# Patient Record
Sex: FEMALE | URBAN - METROPOLITAN AREA
[De-identification: names, ages, dates, MRNs, and addresses within clinical notes are randomized per-mention and may not be internally consistent; named-entity substitution may affect disease eponyms.]

---

## 2017-06-14 ENCOUNTER — IMPORTED ENCOUNTER (OUTPATIENT)
Dept: URBAN - METROPOLITAN AREA CLINIC 43 | Facility: CLINIC | Age: 82
End: 2017-06-14

## 2017-06-14 PROBLEM — H35.363: Noted: 2017-06-14

## 2017-06-14 PROBLEM — H43.812: Noted: 2017-06-14

## 2017-06-14 PROBLEM — H40.1131: Noted: 2017-06-14

## 2017-07-13 ENCOUNTER — APPOINTMENT (RX ONLY)
Dept: URBAN - METROPOLITAN AREA CLINIC 124 | Facility: CLINIC | Age: 82
Setting detail: DERMATOLOGY
End: 2017-07-13

## 2017-07-13 DIAGNOSIS — Z71.89 OTHER SPECIFIED COUNSELING: ICD-10-CM

## 2017-07-13 DIAGNOSIS — L57.8 OTHER SKIN CHANGES DUE TO CHRONIC EXPOSURE TO NONIONIZING RADIATION: ICD-10-CM

## 2017-07-13 DIAGNOSIS — L82.1 OTHER SEBORRHEIC KERATOSIS: ICD-10-CM

## 2017-07-13 DIAGNOSIS — L81.4 OTHER MELANIN HYPERPIGMENTATION: ICD-10-CM

## 2017-07-13 PROBLEM — I10 ESSENTIAL (PRIMARY) HYPERTENSION: Status: ACTIVE | Noted: 2017-07-13

## 2017-07-13 PROBLEM — E78.5 HYPERLIPIDEMIA, UNSPECIFIED: Status: ACTIVE | Noted: 2017-07-13

## 2017-07-13 PROCEDURE — 99203 OFFICE O/P NEW LOW 30 MIN: CPT

## 2017-07-13 PROCEDURE — ? COUNSELING

## 2017-07-13 ASSESSMENT — LOCATION ZONE DERM
LOCATION ZONE: FACE
LOCATION ZONE: TRUNK

## 2017-07-13 ASSESSMENT — LOCATION SIMPLE DESCRIPTION DERM
LOCATION SIMPLE: LEFT CHEEK
LOCATION SIMPLE: CHEST
LOCATION SIMPLE: RIGHT UPPER BACK

## 2017-07-13 ASSESSMENT — LOCATION DETAILED DESCRIPTION DERM
LOCATION DETAILED: LEFT LATERAL SUPERIOR CHEST
LOCATION DETAILED: RIGHT INFERIOR MEDIAL UPPER BACK
LOCATION DETAILED: LEFT INFERIOR MEDIAL MALAR CHEEK

## 2017-07-13 NOTE — PROCEDURE: MIPS QUALITY
Quality 226: Preventive Care And Screening: Tobacco Use: Screening And Cessation Intervention: Patient screened for tobacco and never smoked
Quality 47: Advance Care Plan: Advance Care Planning discussed and documented in the medical record; patient did not wish or was not able to name a surrogate decision maker or provide an advance care plan.
Quality 110: Preventive Care And Screening: Influenza Immunization: Influenza Immunization previously received during influenza season
Detail Level: Detailed
Quality 111:Pneumonia Vaccination Status For Older Adults: Pneumococcal Vaccination Previously Received
Quality 130: Documentation Of Current Medications In The Medical Record: Current Medications Documented
Quality 131: Pain Assessment And Follow-Up: Pain assessment using a standardized tool is documented as negative, no follow-up plan required

## 2017-10-06 NOTE — PATIENT DISCUSSION
Discussed the risks/benefits of laser capsulotomy. Laser recommended. Patient elects to proceed with OD only at this time.

## 2017-10-25 NOTE — PROCEDURE NOTE: CLINICAL
PROCEDURE NOTE: YAG Capsulotomy OD. Diagnosis: Visually Significant PCO. Anesthesia: Topical. Prior to treatment, the risks/benefits/alternatives were discussed. The patient wished to proceed with procedure. Power = 2.8 mJ. Number of pulses = 38. Patient tolerated procedure well. There were no complications. Post-procedure instructions given. Lynne Wade

## 2018-01-22 ENCOUNTER — IMPORTED ENCOUNTER (OUTPATIENT)
Dept: URBAN - METROPOLITAN AREA CLINIC 43 | Facility: CLINIC | Age: 83
End: 2018-01-22

## 2018-01-22 PROBLEM — H40.1131: Noted: 2018-01-22

## 2018-07-18 ENCOUNTER — IMPORTED ENCOUNTER (OUTPATIENT)
Dept: URBAN - METROPOLITAN AREA CLINIC 43 | Facility: CLINIC | Age: 83
End: 2018-07-18

## 2018-07-18 PROBLEM — H35.363: Noted: 2018-07-18

## 2018-07-18 PROBLEM — H40.1131: Noted: 2018-07-18

## 2019-01-25 ENCOUNTER — IMPORTED ENCOUNTER (OUTPATIENT)
Dept: URBAN - METROPOLITAN AREA CLINIC 43 | Facility: CLINIC | Age: 84
End: 2019-01-25

## 2019-01-25 PROBLEM — H40.1131: Noted: 2019-01-25

## 2019-01-30 ENCOUNTER — IMPORTED ENCOUNTER (OUTPATIENT)
Dept: URBAN - METROPOLITAN AREA CLINIC 43 | Facility: CLINIC | Age: 84
End: 2019-01-30

## 2019-07-26 ENCOUNTER — PREPPED CHART (OUTPATIENT)
Dept: URBAN - METROPOLITAN AREA CLINIC 32 | Facility: CLINIC | Age: 84
End: 2019-07-26

## 2019-07-26 ENCOUNTER — IMPORTED ENCOUNTER (OUTPATIENT)
Dept: URBAN - METROPOLITAN AREA CLINIC 43 | Facility: CLINIC | Age: 84
End: 2019-07-26

## 2019-07-26 PROBLEM — H27.111: Noted: 2019-07-26

## 2019-07-26 PROBLEM — H40.1131: Noted: 2019-07-26

## 2020-02-04 ASSESSMENT — TONOMETRY
OS_IOP_MMHG: 14
OD_IOP_MMHG: 16

## 2020-02-04 ASSESSMENT — VISUAL ACUITY
OD_SC: J1+-1
OD_SC: 20/25
OS_SC: J1+
OS_SC: 20/25-3

## 2020-02-05 ENCOUNTER — FOLLOW UP (OUTPATIENT)
Dept: URBAN - METROPOLITAN AREA CLINIC 32 | Facility: CLINIC | Age: 85
End: 2020-02-05

## 2020-02-05 DIAGNOSIS — H40.1131: ICD-10-CM

## 2020-02-05 DIAGNOSIS — H01.012: ICD-10-CM

## 2020-02-05 DIAGNOSIS — H01.015: ICD-10-CM

## 2020-02-05 PROCEDURE — 92133 CPTRZD OPH DX IMG PST SGM ON: CPT

## 2020-02-05 PROCEDURE — 99213 OFFICE O/P EST LOW 20 MIN: CPT

## 2020-02-05 ASSESSMENT — TONOMETRY
OS_IOP_MMHG: 14
OD_IOP_MMHG: 16

## 2020-02-05 ASSESSMENT — VISUAL ACUITY
OD_SC: 20/30
OS_PH: 20/25-2
OS_SC: 20/40+2

## 2020-04-19 ASSESSMENT — VISUAL ACUITY
OD_CC: 20/20
OS_CC: J1
OS_SC: 20/30-2
OS_CC: 20/25-
OS_SC: 20/40
OS_SC: 20/40
OD_SC: 20/25-2
OD_PH: 20/25 -
OD_SC: 20/40
OD_SC: 20/25
OS_SC: 20/30-1
OD_SC: 20/30-1
OS_SC: 20/25 -3
OS_PH: 20/25 -
OD_CC: 20/25+2
OD_SC: 20/40
OS_SC: J1+
OS_SC: 20/30 -2
OD_CC: J1
OD_SC: J1
OD_SC: 20/30 -2
OS_CC: 20/20
OS_SC: 20/25-2
OD_SC: J1+-1
OS_SC: J1

## 2020-04-19 ASSESSMENT — TONOMETRY
OS_IOP_MMHG: 15.0
OD_IOP_MMHG: 16.0
OD_IOP_MMHG: 14.0
OS_IOP_MMHG: 15.0
OD_IOP_MMHG: 15.0
OS_IOP_MMHG: 18.0
OD_IOP_MMHG: 19.0
OS_IOP_MMHG: 15.0
OD_IOP_MMHG: 15.0
OS_IOP_MMHG: 14.0

## 2020-04-19 ASSESSMENT — KERATOMETRY
OS_K2POWER_DIOPTERS: 42
OD_AXISANGLE_DEGREES: 130
OS_AXISANGLE2_DEGREES: 170
OD_K1POWER_DIOPTERS: 43
OS_K1POWER_DIOPTERS: 42.5
OD_AXISANGLE2_DEGREES: 40
OD_K2POWER_DIOPTERS: 42.25
OS_AXISANGLE_DEGREES: 80

## 2020-07-15 ENCOUNTER — ESTABLISHED COMPREHENSIVE EXAM (OUTPATIENT)
Dept: URBAN - METROPOLITAN AREA CLINIC 32 | Facility: CLINIC | Age: 85
End: 2020-07-15

## 2020-07-15 DIAGNOSIS — H40.1131: ICD-10-CM

## 2020-07-15 PROCEDURE — 92014 COMPRE OPH EXAM EST PT 1/>: CPT

## 2020-07-15 PROCEDURE — 92133 CPTRZD OPH DX IMG PST SGM ON: CPT

## 2020-07-15 ASSESSMENT — TONOMETRY
OS_IOP_MMHG: 12
OD_IOP_MMHG: 15

## 2020-07-15 ASSESSMENT — KERATOMETRY
OS_AXISANGLE2_DEGREES: 69
OD_K2POWER_DIOPTERS: 42
OS_K2POWER_DIOPTERS: 41.75
OS_AXISANGLE_DEGREES: 159
OD_K1POWER_DIOPTERS: 43
OD_AXISANGLE2_DEGREES: 117
OD_AXISANGLE_DEGREES: 27
OS_K1POWER_DIOPTERS: 42.25

## 2020-07-15 ASSESSMENT — VISUAL ACUITY
OD_SC: 20/25-2
OU_SC: 20/20-2
OS_SC: J1+
OS_SC: 20/25-2
OD_SC: J1+
OU_SC: J1+

## 2021-01-15 ENCOUNTER — IOP CHECK (OUTPATIENT)
Dept: URBAN - METROPOLITAN AREA CLINIC 32 | Facility: CLINIC | Age: 86
End: 2021-01-15

## 2021-01-15 DIAGNOSIS — H40.1131: ICD-10-CM

## 2021-01-15 DIAGNOSIS — H35.363: ICD-10-CM

## 2021-01-15 PROCEDURE — 92134 CPTRZ OPH DX IMG PST SGM RTA: CPT

## 2021-01-15 PROCEDURE — 99213 OFFICE O/P EST LOW 20 MIN: CPT

## 2021-01-15 ASSESSMENT — TONOMETRY
OD_IOP_MMHG: 13
OS_IOP_MMHG: 12

## 2021-01-15 ASSESSMENT — KERATOMETRY
OS_K1POWER_DIOPTERS: 42.25
OS_AXISANGLE2_DEGREES: 69
OD_AXISANGLE_DEGREES: 27
OD_AXISANGLE2_DEGREES: 117
OD_K2POWER_DIOPTERS: 42
OS_AXISANGLE_DEGREES: 159
OD_K1POWER_DIOPTERS: 43
OS_K2POWER_DIOPTERS: 41.75

## 2021-01-15 ASSESSMENT — VISUAL ACUITY
OU_SC: 20/20
OD_SC: 20/25
OS_SC: 20/25

## 2021-07-29 ENCOUNTER — ESTABLISHED COMPREHENSIVE EXAM (OUTPATIENT)
Dept: URBAN - METROPOLITAN AREA CLINIC 32 | Facility: CLINIC | Age: 86
End: 2021-07-29

## 2021-07-29 DIAGNOSIS — H40.1131: ICD-10-CM

## 2021-07-29 DIAGNOSIS — H35.363: ICD-10-CM

## 2021-07-29 PROCEDURE — 92014 COMPRE OPH EXAM EST PT 1/>: CPT

## 2021-07-29 PROCEDURE — 92134 CPTRZ OPH DX IMG PST SGM RTA: CPT

## 2021-07-29 ASSESSMENT — TONOMETRY
OD_IOP_MMHG: 14
OS_IOP_MMHG: 14

## 2021-07-29 ASSESSMENT — KERATOMETRY
OD_AXISANGLE_DEGREES: 40
OD_K1POWER_DIOPTERS: 42.75
OS_K1POWER_DIOPTERS: 42.25
OD_K2POWER_DIOPTERS: 41.75
OS_AXISANGLE_DEGREES: 175
OS_AXISANGLE2_DEGREES: 85
OS_K2POWER_DIOPTERS: 41.75
OD_AXISANGLE2_DEGREES: 130

## 2021-07-29 ASSESSMENT — VISUAL ACUITY
OS_SC: J1
OD_SC: 20/30-1
OD_SC: J1
OS_SC: 20/25-3

## 2022-06-04 ENCOUNTER — TELEPHONE ENCOUNTER (OUTPATIENT)
Dept: URBAN - METROPOLITAN AREA CLINIC 68 | Facility: CLINIC | Age: 87
End: 2022-06-04

## 2022-06-05 ENCOUNTER — TELEPHONE ENCOUNTER (OUTPATIENT)
Dept: URBAN - METROPOLITAN AREA CLINIC 68 | Facility: CLINIC | Age: 87
End: 2022-06-05

## 2022-06-25 ENCOUNTER — TELEPHONE ENCOUNTER (OUTPATIENT)
Age: 87
End: 2022-06-25

## 2022-06-26 ENCOUNTER — TELEPHONE ENCOUNTER (OUTPATIENT)
Age: 87
End: 2022-06-26